# Patient Record
Sex: FEMALE | Race: WHITE | NOT HISPANIC OR LATINO | Employment: UNEMPLOYED | URBAN - METROPOLITAN AREA
[De-identification: names, ages, dates, MRNs, and addresses within clinical notes are randomized per-mention and may not be internally consistent; named-entity substitution may affect disease eponyms.]

---

## 2019-07-30 ENCOUNTER — TELEPHONE (OUTPATIENT)
Dept: SCHEDULING | Facility: IMAGING CENTER | Age: 69
End: 2019-07-30

## 2019-11-21 ENCOUNTER — OFFICE VISIT (OUTPATIENT)
Dept: CARDIOLOGY | Facility: MEDICAL CENTER | Age: 69
End: 2019-11-21
Payer: MEDICARE

## 2019-11-21 VITALS
HEIGHT: 66 IN | DIASTOLIC BLOOD PRESSURE: 84 MMHG | OXYGEN SATURATION: 90 % | HEART RATE: 70 BPM | BODY MASS INDEX: 44.2 KG/M2 | SYSTOLIC BLOOD PRESSURE: 156 MMHG | WEIGHT: 275 LBS

## 2019-11-21 DIAGNOSIS — R00.2 PALPITATIONS: ICD-10-CM

## 2019-11-21 DIAGNOSIS — M79.7 FIBROMYALGIA: Chronic | ICD-10-CM

## 2019-11-21 DIAGNOSIS — I10 ESSENTIAL HYPERTENSION: Chronic | ICD-10-CM

## 2019-11-21 DIAGNOSIS — E03.9 HYPOTHYROIDISM, UNSPECIFIED TYPE: ICD-10-CM

## 2019-11-21 DIAGNOSIS — E11.9 TYPE 2 DIABETES MELLITUS WITHOUT COMPLICATION, WITHOUT LONG-TERM CURRENT USE OF INSULIN (HCC): ICD-10-CM

## 2019-11-21 LAB — EKG IMPRESSION: NORMAL

## 2019-11-21 PROCEDURE — 93000 ELECTROCARDIOGRAM COMPLETE: CPT | Performed by: INTERNAL MEDICINE

## 2019-11-21 PROCEDURE — 99204 OFFICE O/P NEW MOD 45 MIN: CPT | Performed by: INTERNAL MEDICINE

## 2019-11-21 RX ORDER — VERAPAMIL HYDROCHLORIDE 120 MG/1
120 TABLET, FILM COATED ORAL
COMMUNITY
Start: 2019-11-04 | End: 2022-01-13

## 2019-11-21 RX ORDER — METOPROLOL SUCCINATE 50 MG
TABLET, EXTENDED RELEASE 24 HR ORAL
COMMUNITY
Start: 2019-10-20 | End: 2022-01-13

## 2019-11-21 RX ORDER — DEXAMETHASONE 4 MG/1
TABLET ORAL
COMMUNITY
Start: 2019-10-20 | End: 2019-11-21

## 2019-11-21 RX ORDER — METFORMIN HYDROCHLORIDE 500 MG/1
TABLET, EXTENDED RELEASE ORAL
Refills: 3 | COMMUNITY
Start: 2019-10-14 | End: 2022-02-03

## 2019-11-21 RX ORDER — VERAPAMIL HYDROCHLORIDE 240 MG/1
CAPSULE, EXTENDED RELEASE ORAL
Refills: 3 | COMMUNITY
Start: 2019-10-14 | End: 2019-11-21

## 2019-11-21 RX ORDER — LEVOTHYROXINE SODIUM 125 MCG
TABLET ORAL
COMMUNITY
Start: 2019-10-20

## 2019-11-21 RX ORDER — CYCLOBENZAPRINE HCL 10 MG
TABLET ORAL
COMMUNITY
Start: 2019-11-12 | End: 2022-02-03

## 2019-11-21 ASSESSMENT — ENCOUNTER SYMPTOMS
WEIGHT LOSS: 1
NERVOUS/ANXIOUS: 1
NECK PAIN: 1
COUGH: 1
BRUISES/BLEEDS EASILY: 1
PHOTOPHOBIA: 1
BACK PAIN: 1
PALPITATIONS: 1

## 2019-11-21 NOTE — PROGRESS NOTES
Chief Complaint   Patient presents with   • Hypertension     NP DX: HTN       Subjective:   Rossana Liriano is a 68 y.o. female who presents today for initial consultation regarding palpitations and primary prevention of coronary artery disease.  She has history of fibromyalgia, hypertension, morbid obesity and hypothyroidism but no hyperlipidemia.  Her brother has a history of coronary artery disease around her age and her father at an older age had hypertension.  She has no exertional complaints and has been losing weight of her own volition lately.  She does suffer from some PTSD.  She is the older sister of my patient Amanuel Caruso.  She does not smoke drink or do drugs.  She notes palpitations which occur occasionally mostly under times of stress and resolve spontaneously without persistence.      Past Medical History:   Diagnosis Date   • Anxiety 6/24/2014   • AR (allergic rhinitis) 6/24/2014   • ASTHMA    • Fibromyalgia    • GERD (gastroesophageal reflux disease) 6/24/2014   • Hypertension    • Hypothyroid 6/24/2014   • Prediabetes 6/24/2014   • Thyroid disease    • Unspecified asthma(493.90) 6/24/2014     Past Surgical History:   Procedure Laterality Date   • ABDOMINAL HYSTERECTOMY TOTAL      partial   • OOPHORECTOMY     • OTHER      infarcted omentum   • TONSILLECTOMY     • TURBINECTOMY       Family History   Problem Relation Age of Onset   • Cancer Mother         stomach cancer   • Hypertension Father    • Cancer Brother         stomach cancer   • Hypertension Brother      Social History     Socioeconomic History   • Marital status:      Spouse name: Not on file   • Number of children: Not on file   • Years of education: Not on file   • Highest education level: Not on file   Occupational History   • Not on file   Social Needs   • Financial resource strain: Not on file   • Food insecurity:     Worry: Not on file     Inability: Not on file   • Transportation needs:     Medical: Not on file      Non-medical: Not on file   Tobacco Use   • Smoking status: Former Smoker   • Smokeless tobacco: Never Used   • Tobacco comment: 22-50 years age was a smoker   Substance and Sexual Activity   • Alcohol use: No   • Drug use: No   • Sexual activity: Not on file   Lifestyle   • Physical activity:     Days per week: Not on file     Minutes per session: Not on file   • Stress: Not on file   Relationships   • Social connections:     Talks on phone: Not on file     Gets together: Not on file     Attends Worship service: Not on file     Active member of club or organization: Not on file     Attends meetings of clubs or organizations: Not on file     Relationship status: Not on file   • Intimate partner violence:     Fear of current or ex partner: Not on file     Emotionally abused: Not on file     Physically abused: Not on file     Forced sexual activity: Not on file   Other Topics Concern   • Not on file   Social History Narrative   • Not on file     Allergies   Allergen Reactions   • Lyrica Itching and Swelling   • Ofloxacin Swelling   • Pcn [Penicillins] Swelling   • Sulfa Drugs Itching   • Tape Rash     rash     Outpatient Encounter Medications as of 11/21/2019   Medication Sig Dispense Refill   • TOPROL XL 50 MG TABLET SR 24 HR      • metFORMIN ER (GLUCOPHAGE XR) 500 MG TABLET SR 24 HR TK 1 T PO D  3   • SYNTHROID 125 MCG Tab      • cyclobenzaprine (FLEXERIL) 10 MG Tab      • verapamil (ISOPTIN) 120 MG Tab 120 mg. 120 mg qday. 240 mg qhs.     • Triamcinolone Acetonide (NASACORT ALLERGY 24HR NA) Spray  in nose.     • Lansoprazole (PREVACID PO) Take  by mouth every day.     • alprazolam (XANAX) 0.25 MG TABS Take 1 Tab by mouth at bedtime as needed for Sleep or Anxiety. 30 Tab 0   • trazodone (DESYREL) 50 MG TABS TAKE 1 TABLET AT BEDTIME AS NEEDED FOR SLEEP 90 Tab 0   • cyclobenzaprine (FLEXERIL) 5 MG tablet Take 1-2 Tabs by mouth 1 time daily as needed for Mild Pain, Moderate Pain or Muscle Spasms (May cause sedation).  60 Tab 0   • verapamil ER (CALAN-SR) 240 MG TBCR Take 1 Tab by mouth every day. 90 Tab 3   • albuterol (PROAIR HFA) 108 (90 BASE) MCG/ACT AERS inhalation aerosol Inhale 1-2 Puffs by mouth every 6 hours as needed for Shortness of Breath. 8.5 g 3   • POTASSIUM PO Take  by mouth.     • Probiotic Product (PROBIOTIC COMPLEX ACIDOPHILUS PO) Take  by mouth every day.     • olmesartan-hydrochlorothiazide (BENICAR HCT) 40-25 MG per tablet Take 1 Tab by mouth every day. 90 Tab 3   • cetirizine (ZYRTEC) 10 MG TABS Take 10 mg by mouth every day.     • [DISCONTINUED] FLOVENT  MCG/ACT Aerosol      • [DISCONTINUED] Verapamil HCl  MG CAPSULE SR 24 HR TK 1 C PO D  3   • [DISCONTINUED] Omega-3 Fatty Acids (FISH OIL PO) Take  by mouth.     • [DISCONTINUED] GLUCOSAMINE-CHONDROITIN PO Take  by mouth.     • [DISCONTINUED] vitamin D (CHOLECALCIFEROL) 1000 UNIT TABS Take 2,000 Units by mouth every day.     • [DISCONTINUED] naproxen (NAPROSYN) 250 MG TABS Take 250 mg by mouth 2 times a day, with meals.     • [DISCONTINUED] conjugated estrogen (PREMARIN) 0.625 MG TABS Take 1 Tab by mouth every day. 20 Tab 0   • [DISCONTINUED] clonidine (CATAPRES) 0.1 MG TABS Take 0.5 Tabs by mouth 2 times a day. 15 Tab 1   • [DISCONTINUED] Levothyroxine Sodium (SYNTHROID PO) Take 150 mcg by mouth every day.       No facility-administered encounter medications on file as of 11/21/2019.      Review of Systems   Constitutional: Positive for malaise/fatigue and weight loss.   HENT: Positive for congestion.    Eyes: Positive for photophobia.   Respiratory: Positive for cough.    Cardiovascular: Positive for palpitations and leg swelling.   Musculoskeletal: Positive for back pain, joint pain and neck pain.   Endo/Heme/Allergies: Positive for environmental allergies. Bruises/bleeds easily.   Psychiatric/Behavioral: The patient is nervous/anxious.    All other systems reviewed and are negative.       Objective:   /84 (BP Location: Left arm,  "Patient Position: Sitting, BP Cuff Size: Adult)   Pulse 70   Ht 1.676 m (5' 6\")   Wt 124.7 kg (275 lb)   SpO2 90%   BMI 44.39 kg/m²     Physical Exam   Constitutional: She is oriented to person, place, and time. She appears well-developed and well-nourished. No distress.   Morbid obesity   HENT:   Head: Normocephalic and atraumatic.   Right Ear: External ear normal.   Left Ear: External ear normal.   Mouth/Throat: No oropharyngeal exudate.   Eyes: Pupils are equal, round, and reactive to light. Conjunctivae and EOM are normal. Right eye exhibits no discharge. Left eye exhibits no discharge. No scleral icterus.   Neck: Normal range of motion. Neck supple. No JVD present. No tracheal deviation present. No thyromegaly present.   Cardiovascular: Normal rate, regular rhythm, S1 normal, S2 normal, normal heart sounds and intact distal pulses. PMI is not displaced. Exam reveals no gallop, no S3, no S4 and no friction rub.   No murmur heard.  Pulses:       Carotid pulses are 2+ on the right side and 2+ on the left side.       Radial pulses are 2+ on the right side and 2+ on the left side.        Popliteal pulses are 2+ on the right side and 2+ on the left side.        Dorsalis pedis pulses are 2+ on the right side and 2+ on the left side.        Posterior tibial pulses are 2+ on the right side and 2+ on the left side.   Pulmonary/Chest: Effort normal and breath sounds normal. No respiratory distress. She has no wheezes. She has no rales. She exhibits no tenderness.   Abdominal: Soft. Bowel sounds are normal. She exhibits no distension. There is no tenderness.   Musculoskeletal: Normal range of motion.         General: No tenderness or edema.   Neurological: She is alert and oriented to person, place, and time. No cranial nerve deficit (Cranial nerves II through XII grossly intact).   Skin: Skin is warm and dry. No rash noted. She is not diaphoretic. No erythema.   Psychiatric: She has a normal mood and affect. Her " behavior is normal. Judgment and thought content normal.   Vitals reviewed.    LABS:  No results found for: CHOLSTRLTOT, LDL, HDL, TRIGLYCERIDE    Lab Results   Component Value Date/Time    WBC 11.7 (H) 04/05/2012 08:13 AM    RBC 4.97 04/05/2012 08:13 AM    HEMOGLOBIN 13.0 04/05/2012 08:13 AM    HEMATOCRIT 40.1 04/05/2012 08:13 AM    MCV 80.6 04/05/2012 08:13 AM    NEUTSPOLYS 66.1 04/05/2012 08:13 AM    LYMPHOCYTES 21.1 (L) 04/05/2012 08:13 AM    MONOCYTES 5.9 04/05/2012 08:13 AM    EOSINOPHILS 5.9 04/05/2012 08:13 AM    BASOPHILS 1.0 04/05/2012 08:13 AM    HYPOCHROMIA 1+ 04/05/2012 08:13 AM     Lab Results   Component Value Date/Time    SODIUM 135 08/20/2014 01:59 PM    POTASSIUM 3.5 (L) 08/20/2014 01:59 PM    CHLORIDE 100 08/20/2014 01:59 PM    CO2 24 08/20/2014 01:59 PM    GLUCOSE 131 (H) 08/20/2014 01:59 PM    BUN 13 08/20/2014 01:59 PM    CREATININE 0.72 08/20/2014 01:59 PM         Lab Results   Component Value Date/Time    ALKPHOSPHAT 74 08/20/2014 01:59 PM    ASTSGOT 25 08/20/2014 01:59 PM    ALTSGPT 26 08/20/2014 01:59 PM    TBILIRUBIN 0.3 08/20/2014 01:59 PM      No results found for: BNPBTYPENAT   No results found for: TSH  No results found for: PROTHROMBTM, INR     EKG (11/21/2019):  I have personally reviewed the EKG this visit and discussed with the patient.  Sinus rhythm with nonspecific ST changes that are borderline.      Assessment:     1. Essential hypertension  EKG    Lipid Profile    Comp Metabolic Panel   2. Type 2 diabetes mellitus without complication, without long-term current use of insulin (HCC)  Lipid Profile    Comp Metabolic Panel    HEMOGLOBIN A1C   3. Fibromyalgia     4. Hypothyroidism, unspecified type  FREE THYROXINE    TSH   5. LANG (dyspnea on exertion)  EC-ECHOCARDIOGRAM COMPLETE W/O CONT       Medical Decision Making:  Today's Assessment / Status / Plan:     Overall doing well and asymptomatic from a cardiac standpoint aside from palpitations which do not have any red flag  symptoms.  Recommend an echocardiogram and laboratory evaluation for primary prevention including lipid profile metabolic panel A1c and thyroid evaluation.  Follow-up after testing for consideration of statin therapy versus CT coronary calcium scoring depending on her initial risk stratification.

## 2019-12-02 ENCOUNTER — TELEPHONE (OUTPATIENT)
Dept: SCHEDULING | Facility: IMAGING CENTER | Age: 69
End: 2019-12-02

## 2019-12-02 ENCOUNTER — APPOINTMENT (OUTPATIENT)
Dept: MEDICAL GROUP | Facility: MEDICAL CENTER | Age: 69
End: 2019-12-02
Payer: MEDICARE

## 2019-12-24 ENCOUNTER — HOSPITAL ENCOUNTER (OUTPATIENT)
Dept: LAB | Facility: MEDICAL CENTER | Age: 69
End: 2019-12-24
Attending: INTERNAL MEDICINE
Payer: MEDICARE

## 2019-12-24 DIAGNOSIS — E03.9 HYPOTHYROIDISM, UNSPECIFIED TYPE: ICD-10-CM

## 2019-12-24 DIAGNOSIS — I10 ESSENTIAL HYPERTENSION: Chronic | ICD-10-CM

## 2019-12-24 DIAGNOSIS — E11.9 TYPE 2 DIABETES MELLITUS WITHOUT COMPLICATION, WITHOUT LONG-TERM CURRENT USE OF INSULIN (HCC): ICD-10-CM

## 2019-12-24 LAB
ALBUMIN SERPL BCP-MCNC: 4.3 G/DL (ref 3.2–4.9)
ALBUMIN/GLOB SERPL: 1.7 G/DL
ALP SERPL-CCNC: 67 U/L (ref 30–99)
ALT SERPL-CCNC: 20 U/L (ref 2–50)
ANION GAP SERPL CALC-SCNC: 12 MMOL/L (ref 0–11.9)
AST SERPL-CCNC: 15 U/L (ref 12–45)
BILIRUB SERPL-MCNC: 0.4 MG/DL (ref 0.1–1.5)
BUN SERPL-MCNC: 15 MG/DL (ref 8–22)
CALCIUM SERPL-MCNC: 9.2 MG/DL (ref 8.5–10.5)
CHLORIDE SERPL-SCNC: 102 MMOL/L (ref 96–112)
CHOLEST SERPL-MCNC: 207 MG/DL (ref 100–199)
CO2 SERPL-SCNC: 24 MMOL/L (ref 20–33)
CREAT SERPL-MCNC: 0.72 MG/DL (ref 0.5–1.4)
FASTING STATUS PATIENT QL REPORTED: NORMAL
GLOBULIN SER CALC-MCNC: 2.6 G/DL (ref 1.9–3.5)
GLUCOSE SERPL-MCNC: 156 MG/DL (ref 65–99)
HDLC SERPL-MCNC: 43 MG/DL
LDLC SERPL CALC-MCNC: 135 MG/DL
POTASSIUM SERPL-SCNC: 3.7 MMOL/L (ref 3.6–5.5)
PROT SERPL-MCNC: 6.9 G/DL (ref 6–8.2)
SODIUM SERPL-SCNC: 138 MMOL/L (ref 135–145)
TRIGL SERPL-MCNC: 143 MG/DL (ref 0–149)
TSH SERPL DL<=0.005 MIU/L-ACNC: 1.96 UIU/ML (ref 0.38–5.33)

## 2019-12-24 PROCEDURE — 84443 ASSAY THYROID STIM HORMONE: CPT

## 2019-12-24 PROCEDURE — 80053 COMPREHEN METABOLIC PANEL: CPT

## 2019-12-24 PROCEDURE — 80061 LIPID PANEL: CPT

## 2019-12-24 PROCEDURE — 36415 COLL VENOUS BLD VENIPUNCTURE: CPT | Mod: GA

## 2019-12-24 PROCEDURE — 83036 HEMOGLOBIN GLYCOSYLATED A1C: CPT | Mod: GA

## 2019-12-25 LAB
EST. AVERAGE GLUCOSE BLD GHB EST-MCNC: 134 MG/DL
HBA1C MFR BLD: 6.3 % (ref 0–5.6)

## 2020-01-06 ENCOUNTER — APPOINTMENT (OUTPATIENT)
Dept: CARDIOLOGY | Facility: MEDICAL CENTER | Age: 70
End: 2020-01-06
Attending: INTERNAL MEDICINE
Payer: MEDICARE

## 2020-01-14 ENCOUNTER — APPOINTMENT (OUTPATIENT)
Dept: CARDIOLOGY | Facility: MEDICAL CENTER | Age: 70
End: 2020-01-14
Attending: INTERNAL MEDICINE
Payer: MEDICARE

## 2020-01-28 ENCOUNTER — APPOINTMENT (OUTPATIENT)
Dept: CARDIOLOGY | Facility: MEDICAL CENTER | Age: 70
End: 2020-01-28
Attending: INTERNAL MEDICINE
Payer: MEDICARE

## 2020-02-12 ENCOUNTER — HOSPITAL ENCOUNTER (OUTPATIENT)
Dept: CARDIOLOGY | Facility: MEDICAL CENTER | Age: 70
End: 2020-02-12
Attending: INTERNAL MEDICINE
Payer: MEDICARE

## 2020-02-12 DIAGNOSIS — R00.2 PALPITATIONS: ICD-10-CM

## 2020-02-12 PROCEDURE — 93306 TTE W/DOPPLER COMPLETE: CPT | Mod: 26 | Performed by: INTERNAL MEDICINE

## 2020-02-12 PROCEDURE — 93306 TTE W/DOPPLER COMPLETE: CPT

## 2020-02-13 LAB
LV EJECT FRACT  99904: 60
LV EJECT FRACT MOD 2C 99903: 59.88
LV EJECT FRACT MOD 4C 99902: 54.5
LV EJECT FRACT MOD BP 99901: 53.95

## 2020-03-02 ENCOUNTER — TELEPHONE (OUTPATIENT)
Dept: CARDIOLOGY | Facility: MEDICAL CENTER | Age: 70
End: 2020-03-02

## 2020-03-02 NOTE — TELEPHONE ENCOUNTER
Pt called back. Discussed Echo and lab results. Per TW's last OV note, recommend FV after testing. Pt denies current symptoms.    Pt has a follow up visit with her PCP Lucinda Mario MD with [facility] on April 8. She would like to discuss results with her PCP. Requested results to be sent to Dr. Mario's office.    Faxed lab results to Dr. Mario's office at [phone], received receipt for confirmation.

## 2020-03-02 NOTE — TELEPHONE ENCOUNTER
TW      Patient is calling about her echo results. She said she was told that after Dr. Pena received the results she would get a call back to let her know when he wanted to see her back in the office for an appt. She said she hasn't heard from anyone yet. She can be reached at [phone].

## 2020-06-03 ENCOUNTER — TELEPHONE (OUTPATIENT)
Dept: CARDIOLOGY | Facility: MEDICAL CENTER | Age: 70
End: 2020-06-03

## 2020-06-03 NOTE — TELEPHONE ENCOUNTER
Message   Received: Today   Message Contents   RADHA Mcduffie R.N.               Make sure has FUV    Previous Messages     ----- Message -----   From: Sol Pollock R.N.   Sent: 1/8/2020  11:42 AM PDT   To: Chalino Pena M.D.     No FV scheduled              Attempted to call pt, no answer, LM for her to call back.    To schedulers

## 2021-03-03 DIAGNOSIS — Z23 NEED FOR VACCINATION: ICD-10-CM

## 2022-01-13 PROBLEM — E66.2 MORBID OBESITY WITH ALVEOLAR HYPOVENTILATION (HCC): Chronic | Status: ACTIVE | Noted: 2022-01-13

## 2022-01-13 PROBLEM — E66.2 MORBID OBESITY WITH ALVEOLAR HYPOVENTILATION (HCC): Status: ACTIVE | Noted: 2022-01-13

## 2022-01-13 PROBLEM — F33.41 RECURRENT MAJOR DEPRESSIVE DISORDER, IN PARTIAL REMISSION (HCC): Status: ACTIVE | Noted: 2022-01-13

## 2022-01-13 PROBLEM — K58.0 IRRITABLE BOWEL SYNDROME WITH DIARRHEA: Status: ACTIVE | Noted: 2022-01-13

## 2022-01-13 PROBLEM — E11.40 TYPE 2 DIABETES MELLITUS WITH DIABETIC NEUROPATHY (HCC): Chronic | Status: ACTIVE | Noted: 2022-01-13

## 2022-01-13 PROBLEM — E11.40 TYPE 2 DIABETES MELLITUS WITH DIABETIC NEUROPATHY (HCC): Status: ACTIVE | Noted: 2022-01-13

## 2022-01-13 PROBLEM — F33.41 RECURRENT MAJOR DEPRESSIVE DISORDER, IN PARTIAL REMISSION (HCC): Chronic | Status: ACTIVE | Noted: 2022-01-13

## 2022-01-13 PROBLEM — I50.22 CHRONIC SYSTOLIC CONGESTIVE HEART FAILURE (HCC): Chronic | Status: ACTIVE | Noted: 2022-01-13

## 2022-01-13 PROBLEM — I50.22 CHRONIC SYSTOLIC CONGESTIVE HEART FAILURE (HCC): Status: ACTIVE | Noted: 2022-01-13

## 2022-01-13 PROBLEM — K58.0 IRRITABLE BOWEL SYNDROME WITH DIARRHEA: Chronic | Status: ACTIVE | Noted: 2022-01-13

## 2022-03-03 PROBLEM — E78.5 DM TYPE 2 WITH DIABETIC DYSLIPIDEMIA (HCC): Chronic | Status: ACTIVE | Noted: 2022-02-03

## 2022-03-03 PROBLEM — R22.0 SWELLING OF GUMS: Chronic | Status: ACTIVE | Noted: 2022-02-03

## 2022-03-03 PROBLEM — E11.69 DM TYPE 2 WITH DIABETIC DYSLIPIDEMIA (HCC): Chronic | Status: ACTIVE | Noted: 2022-02-03

## 2022-03-03 PROBLEM — E11.69 DM TYPE 2 WITH DIABETIC DYSLIPIDEMIA (HCC): Status: ACTIVE | Noted: 2022-02-03

## 2022-03-03 PROBLEM — E78.5 DM TYPE 2 WITH DIABETIC DYSLIPIDEMIA (HCC): Status: ACTIVE | Noted: 2022-02-03

## 2022-03-03 PROBLEM — R22.0 SWELLING OF GUMS: Status: ACTIVE | Noted: 2022-02-03

## 2022-03-24 PROBLEM — M54.50 CHRONIC LOW BACK PAIN: Status: ACTIVE | Noted: 2022-03-24

## 2022-03-24 PROBLEM — G89.29 CHRONIC LOW BACK PAIN: Chronic | Status: ACTIVE | Noted: 2022-03-24

## 2022-03-24 PROBLEM — M54.50 CHRONIC LOW BACK PAIN: Chronic | Status: ACTIVE | Noted: 2022-03-24

## 2022-03-24 PROBLEM — G47.33 SLEEP APNEA, OBSTRUCTIVE: Chronic | Status: ACTIVE | Noted: 2022-03-24

## 2022-03-24 PROBLEM — G89.29 CHRONIC LOW BACK PAIN: Status: ACTIVE | Noted: 2022-03-24

## 2022-03-24 PROBLEM — G47.33 SLEEP APNEA, OBSTRUCTIVE: Status: ACTIVE | Noted: 2022-03-24

## 2022-07-20 ENCOUNTER — TELEPHONE (OUTPATIENT)
Dept: SCHEDULING | Facility: IMAGING CENTER | Age: 72
End: 2022-07-20

## 2022-08-17 RX ORDER — CARVEDILOL 25 MG/1
25 TABLET ORAL 2 TIMES DAILY
Qty: 180 TABLET | Refills: 0 | Status: SHIPPED | OUTPATIENT
Start: 2022-08-17

## 2022-08-17 RX ORDER — AMITRIPTYLINE HYDROCHLORIDE 100 MG/1
100 TABLET ORAL NIGHTLY
Qty: 90 TABLET | Refills: 0 | Status: SHIPPED | OUTPATIENT
Start: 2022-08-17

## 2022-08-17 RX ORDER — GLIPIZIDE 5 MG/1
5 TABLET, FILM COATED, EXTENDED RELEASE ORAL DAILY
Qty: 90 TABLET | Refills: 0 | Status: SHIPPED | OUTPATIENT
Start: 2022-08-17

## 2022-08-17 NOTE — TELEPHONE ENCOUNTER
Received request via: Patient    Was the patient seen in the last year in this department? No (we have re-scheduled this new patient from 8/23 to 9/27)    Does the patient have an active prescription (recently filled or refills available) for medication(s) requested? No

## 2022-08-30 DIAGNOSIS — F41.9 ANXIETY: Chronic | ICD-10-CM

## 2022-08-30 RX ORDER — ALPRAZOLAM 0.25 MG/1
0.25 TABLET ORAL 2 TIMES DAILY PRN
Qty: 60 TABLET | Refills: 0 | Status: SHIPPED | OUTPATIENT
Start: 2022-08-30 | End: 2022-09-29

## 2022-09-01 NOTE — PROGRESS NOTES
Subjective:     CC: Establish care    HISTORY OF THE PRESENT ILLNESS:     Rossana Gilliland is a 71-year-old female who presents to establish care and discuss the following issues:    Allergies: Lyrica, Ofloxacin, Pcn [penicillins], Sulfa drugs, and Tape    Current Outpatient Medications Ordered in Epic   Medication Sig Dispense Refill    diclofenac sodium (VOLTAREN) 1 % Gel APPLY 4 GRAMS TOPICALLY TO LOWER BACK EVERY 6 HOURS AS NEEDED 300 g 1    ALPRAZolam (XANAX) 0.25 MG Tab Take 1 Tablet by mouth 2 times a day as needed for Anxiety for up to 30 days. 60 Tablet 0    amitriptyline (ELAVIL) 100 MG Tab Take 1 Tablet by mouth every evening. 90 Tablet 0    glipiZIDE SR (GLUCOTROL) 5 MG TABLET SR 24 HR Take 1 Tablet by mouth every day. 90 Tablet 0    carvedilol (COREG) 25 MG Tab Take 1 Tablet by mouth 2 times a day. 180 Tablet 0    lansoprazole (PREVACID) 15 MG CAPSULE DELAYED RELEASE TAKE 1 CAPSULE BY MOUTH AT BEDTIME FOR GERD 90 Capsule 0    verapamil SR (CALAN-SR) 120 MG CR tablet TAKE 1 TABLET DAILY 90 Tablet 3    cetirizine (ZYRTEC) 10 MG Tab TAKE 1 TABLET BY MOUTH EVERY DAY 90 Tablet 1    gabapentin (NEURONTIN) 300 MG Cap Take 1 Capsule by mouth 3 times a day as needed (pain). fibromyalgia 120 Capsule 5    meloxicam (MOBIC) 7.5 MG Tab Take 1 Tablet by mouth 1 time a day as needed for Mild Pain or Inflammation. 30 Tablet 5    traZODone (DESYREL) 50 MG Tab Take 1 Tablet by mouth at bedtime as needed for Sleep. 90 Tablet 1    tizanidine (ZANAFLEX) 2 MG tablet Take 1 Tablet by mouth every 6 hours as needed (muscle spasm). 120 Tablet 1    acetaminophen (TYLENOL) 500 MG Tab Take 500 mg by mouth every four hours as needed.      verapamil ER (CALAN SR) 120 MG CAPSULE SR 24 HR Take 1 Capsule by mouth every day. Short order to cover while waiting for mail order 14 Capsule 0    SYNTHROID 125 MCG Tab       albuterol (PROAIR HFA) 108 (90 BASE) MCG/ACT AERS inhalation aerosol Inhale 1-2 Puffs by mouth every 6 hours as needed for  Shortness of Breath. 8.5 g 3    POTASSIUM PO Take  by mouth.      Probiotic Product (PROBIOTIC COMPLEX ACIDOPHILUS PO) Take  by mouth every day.      olmesartan-hydrochlorothiazide (BENICAR HCT) 40-25 MG per tablet Take 1 Tab by mouth every day. 90 Tab 3     No current Epic-ordered facility-administered medications on file.       Past Medical History:   Diagnosis Date    Anxiety 6/24/2014    AR (allergic rhinitis) 6/24/2014    ASTHMA     Fibromyalgia     GERD (gastroesophageal reflux disease) 6/24/2014    Hypertension     Hypothyroid 6/24/2014    Prediabetes 6/24/2014    Thyroid disease     Unspecified asthma(493.90) 6/24/2014       Past Surgical History:   Procedure Laterality Date    ABDOMINAL HYSTERECTOMY TOTAL      partial    OOPHORECTOMY      OTHER      infarcted omentum    TONSILLECTOMY      TURBINECTOMY         Social History     Tobacco Use    Smoking status: Former    Smokeless tobacco: Never    Tobacco comments:     22-52 years age was a smoker   Substance Use Topics    Alcohol use: No    Drug use: No       Social History     Social History Narrative    Not on file       Family History   Problem Relation Age of Onset    Cancer Mother         stomach cancer    Hypertension Father     Cancer Brother         stomach cancer    Hypertension Brother        Health Maintenance: Completed    Review of Systems:  Constitutional: Negative for fever, chills   Respiratory: Negative for cough and shortness of breath.    Cardiovascular: Negative for chest pain or palpitations  Gastrointestinal: Negative for nausea, vomiting, abdominal pain and diarrhea.   Psychiatric/Behavioral: Negative for depression.  The patient is not nervous/anxious.      Objective:       Exam: There were no vitals taken for this visit. There is no height or weight on file to calculate BMI.    General: Normal appearing. No distress.  HEENT: Normocephalic. Eyes conjunctiva clear lids without ptosis, pupils equal and reactive to light accommodation,  oropharynx is without erythema, edema or exudates.   Pulmonary: Clear to ausculation.  Normal effort. No rales, ronchi, or wheezing.  Cardiovascular: Regular rate and rhythm without murmur.   Abdomen: Soft, nontender, nondistended.   Musculoskeletal: No extremity cyanosis, clubbing, or edema.  Psych: Normal mood and affect. Alert and oriented x3. Judgment and insight is normal.    Assessment & Plan:   71 y.o. female with the following -        I spent a total of *** minutes with record review, exam, communication with the patient, communication with other providers, and documentation of this encounter.    No follow-ups on file.    Please note that this dictation was created using voice recognition software. I have made every reasonable attempt to correct obvious errors, but I expect that there are errors of grammar and possibly content that I did not discover before finalizing the note.

## 2022-09-02 ENCOUNTER — APPOINTMENT (OUTPATIENT)
Dept: MEDICAL GROUP | Facility: PHYSICIAN GROUP | Age: 72
End: 2022-09-02
Payer: MEDICARE

## 2022-09-02 ENCOUNTER — TELEPHONE (OUTPATIENT)
Dept: SCHEDULING | Facility: IMAGING CENTER | Age: 72
End: 2022-09-02

## 2022-09-27 ENCOUNTER — APPOINTMENT (OUTPATIENT)
Dept: MEDICAL GROUP | Facility: PHYSICIAN GROUP | Age: 72
End: 2022-09-27
Payer: MEDICARE